# Patient Record
Sex: MALE | Race: WHITE | URBAN - METROPOLITAN AREA
[De-identification: names, ages, dates, MRNs, and addresses within clinical notes are randomized per-mention and may not be internally consistent; named-entity substitution may affect disease eponyms.]

---

## 2018-09-19 ENCOUNTER — OFFICE VISIT (OUTPATIENT)
Dept: FAMILY MEDICINE CLINIC | Age: 28
End: 2018-09-19

## 2018-09-19 VITALS
BODY MASS INDEX: 25.87 KG/M2 | TEMPERATURE: 97.9 F | RESPIRATION RATE: 18 BRPM | DIASTOLIC BLOOD PRESSURE: 70 MMHG | SYSTOLIC BLOOD PRESSURE: 104 MMHG | HEIGHT: 72 IN | WEIGHT: 191 LBS | HEART RATE: 66 BPM | OXYGEN SATURATION: 97 %

## 2018-09-19 DIAGNOSIS — Z13.31 SCREENING FOR DEPRESSION: ICD-10-CM

## 2018-09-19 DIAGNOSIS — Z00.00 ROUTINE GENERAL MEDICAL EXAMINATION AT A HEALTH CARE FACILITY: Primary | ICD-10-CM

## 2018-09-19 NOTE — PROGRESS NOTES
HISTORY OF PRESENT ILLNESS  Omid Smallwood is a 25 y.o. male. HPI Comments: Eun Henson is here to establish care and get a \"check up\" because he and his wife are planning to start a family. However, he does not want labs at this time. He recently moved here after living in [facility] for 9 years total.    He is also very busy at work and will be until October 19th, so he wants to hold off on the flu shot until then. He doesn't smoke. He belongs to the , and after October 19th he will start swimmig again. He has no complaits today. Review of Systems   Constitutional: Negative for chills and fever. HENT: Negative for congestion, ear pain and sore throat. Eyes: Negative for discharge and redness. Respiratory: Negative for cough and shortness of breath. Cardiovascular: Negative for chest pain, palpitations and orthopnea. Gastrointestinal: Negative for abdominal pain, nausea and vomiting. Genitourinary: Negative for frequency and urgency. Skin: Negative for rash. Neurological: Negative for weakness and headaches. Endo/Heme/Allergies: Does not bruise/bleed easily. Physical Exam   Constitutional: He is oriented to person, place, and time. He appears well-developed and well-nourished. No distress. HENT:   Head: Normocephalic. Right Ear: External ear normal.   Left Ear: External ear normal.   Eyes: Conjunctivae are normal. Pupils are equal, round, and reactive to light. Neck: Normal range of motion. Neck supple. No thyromegaly present. Cardiovascular: Normal rate, regular rhythm and normal heart sounds. No murmur heard. Pulmonary/Chest: Effort normal and breath sounds normal. No respiratory distress. He has no wheezes. He has no rales. Abdominal: Soft. Bowel sounds are normal. He exhibits no distension and no mass. There is no tenderness. There is no rebound and no guarding.    Genitourinary: Penis normal.   Genitourinary Comments: Testicles normal, no hernia appreciated Musculoskeletal: Normal range of motion. He exhibits no tenderness. Lymphadenopathy:     He has no cervical adenopathy. Neurological: He is alert and oriented to person, place, and time. Coordination normal.   Skin: No rash noted. Psychiatric: He has a normal mood and affect. His behavior is normal.   Nursing note and vitals reviewed. ASSESSMENT and PLAN    ICD-10-CM ICD-9-CM    1. Routine general medical examination at a health care facility Z00.00 V70.0    2. Screening for depression Z13.89 V79.0 BEHAV ASSMT W/SCORE & DOCD/STAND INSTRUMENT     Pt declines flu shot now (will come back in October) and declines labs.     AVS instructions reviewed with patient, pt verbalized understanding

## 2018-09-19 NOTE — MR AVS SNAPSHOT
[address] [id number] 
[phone] Patient: Zarina Luong MRN: [id number] Visit Information Date & Time Provider Department Dept. Phone Encounter #  
 9/19/2018  7:45 AM Jessi Naylor MD [employer] [phone] [id number] Follow-up Instructions Return in about 1 year (around 9/19/2019). Upcoming Health Maintenance Date Due DTaP/Tdap/Td series (1 - Tdap) 8/3/2009 Influenza Age 5 to Adult 8/1/2018 Allergies as of 9/19/2018  Review Complete On: 9/19/2018 By: Jessi Naylor MD  
 No Known Allergies Current Immunizations  Never Reviewed No immunizations on file. Not reviewed this visit You Were Diagnosed With   
  
 Codes Comments Routine general medical examination at a health care facility    -  Primary ICD-10-CM: Z00.00 ICD-9-CM: V70.0 Vitals BP Pulse Temp Resp Height(growth percentile) Weight(growth percentile) 104/70 (BP 1 Location: Right arm, BP Patient Position: Sitting) 66 97.9 °F (36.6 °C) (Oral) 18 5' 11.5\" (1.816 m) 191 lb (86.6 kg) SpO2 BMI Smoking Status 97% 26.27 kg/m2 Never Smoker Vitals History BMI and BSA Data Body Mass Index Body Surface Area  
 26.27 kg/m 2 2.09 m 2 Your Updated Medication List  
  
Notice  As of 9/19/2018  8:41 AM  
 You have not been prescribed any medications. Follow-up Instructions Return in about 1 year (around 9/19/2019). Patient Instructions Return after 10/19 to get your flu shot. When you start swimming at the , swim for at least 30 minutes 3 times per week and do strength training twice weekly. This will make you healthier, feel better, and less prone to injury or illness. Introducing [facility]!    
 Mark Lewis introduces [employer] patient portal. Now you can access parts of your medical record, email your doctor's office, and request medication refills online. 1. In your internet browser, go to [email or web address] 2. Click on the First Time User? Click Here link in the Sign In box. You will see the New Member Sign Up page. 3. Enter your [employer] Access Code exactly as it appears below. You will not need to use this code after youve completed the sign-up process. If you do not sign up before the expiration date, you must request a new code. · [employer] Access Code: [email or web address] Expires: 12/18/2018  8:17 AM 
 
4. Enter the last four digits of your Social Security Number (xxxx) and Date of Birth (mm/dd/yyyy) as indicated and click Submit. You will be taken to the next sign-up page. 5. Create a [employer] ID. This will be your [employer] login ID and cannot be changed, so think of one that is secure and easy to remember. 6. Create a [employer] password. You can change your password at any time. 7. Enter your Password Reset Question and Answer. This can be used at a later time if you forget your password. 8. Enter your e-mail address. You will receive e-mail notification when new information is available in [address] 9. Click Sign Up. You can now view and download portions of your medical record. 10. Click the Download Summary menu link to download a portable copy of your medical information. If you have questions, please visit the Frequently Asked Questions section of the [employer] website. Remember, [employer] is NOT to be used for urgent needs. For medical emergencies, dial 911. Now available from your iPhone and Android! Please provide this summary of care documentation to your next provider. If you have any questions after today's visit, please call [phone].

## 2018-09-19 NOTE — PATIENT INSTRUCTIONS
Return after 10/19 to get your flu shot. [facility] for at least 30 minutes 3 times per week and do strength training twice weekly. This will make you healthier, feel better, and less prone to injury or illness.